# Patient Record
Sex: FEMALE | Race: WHITE | Employment: OTHER | URBAN - METROPOLITAN AREA
[De-identification: names, ages, dates, MRNs, and addresses within clinical notes are randomized per-mention and may not be internally consistent; named-entity substitution may affect disease eponyms.]

---

## 2017-11-10 ENCOUNTER — OFFICE VISIT (OUTPATIENT)
Dept: SURGERY | Facility: CLINIC | Age: 67
End: 2017-11-10
Payer: COMMERCIAL

## 2017-11-10 VITALS
BODY MASS INDEX: 27.64 KG/M2 | TEMPERATURE: 98.1 F | HEIGHT: 66 IN | HEART RATE: 72 BPM | DIASTOLIC BLOOD PRESSURE: 72 MMHG | WEIGHT: 172 LBS | SYSTOLIC BLOOD PRESSURE: 115 MMHG

## 2017-11-10 DIAGNOSIS — Z78.9: Primary | ICD-10-CM

## 2017-11-10 PROCEDURE — 99201 ZZC OFFICE/OUTPT VISIT, NEW, LEVEL I: CPT | Performed by: SURGERY

## 2017-11-10 RX ORDER — NAPROXEN 500 MG/1
500 TABLET ORAL
COMMUNITY
Start: 2017-11-08

## 2017-11-10 NOTE — NURSING NOTE
"Initial /72 (BP Location: Right arm, Patient Position: Chair, Cuff Size: Adult Regular)  Pulse 72  Temp 98.1  F (36.7  C) (Oral)  Ht 1.676 m (5' 6\")  Wt 78 kg (172 lb)  BMI 27.76 kg/m2 Estimated body mass index is 27.76 kg/(m^2) as calculated from the following:    Height as of this encounter: 1.676 m (5' 6\").    Weight as of this encounter: 78 kg (172 lb). .    Lynne Lobo MA    "

## 2017-11-10 NOTE — PROGRESS NOTES
67-year-old female complaining of right axillary mass for the past year. Patient reports the mass is enlarging and has a heartbeat. She is concerned this could be something serious. Patient recently had a mammogram within the past month which was read as totally negative. Also, one year ago she had an ultrasound done of the area which showed no abnormalities. On physical exam she has a palpable latissimus dorsi muscle tendon in this area and this seems to be the mass she is referring to. I can appreciate no adenopathy or other abnormalities. I reassured her that this was benign and a normal anatomic variant. We discussed what lymphadenopathy would feel like and I also reassured her that this was not cancer. She is welcome to return to clinic at any time.    Travon Poon MD

## 2017-11-10 NOTE — MR AVS SNAPSHOT
"              After Visit Summary   11/10/2017    Opal Dawson    MRN: [id number]           Patient Information     Date Of Birth          1950        Visit Information        Provider Department      11/10/2017 8:30 AM Travon Poon MD [facility]        Today's Diagnoses     Normal appearance of tissue    -  1      Care Instructions    Per Dr. Poon's instructions          Follow-ups after your visit        Who to contact     If you have questions or need follow up information about today's clinic visit or your schedule please contact [facility] directly at [phone].  Normal or non-critical lab and imaging results will be communicated to you by [email or web address], letter or phone within 4 business days after the clinic has received the results. If you do not hear from us within 7 days, please contact the clinic through [email or web address] or phone. If you have a critical or abnormal lab result, we will notify you by phone as soon as possible.  Submit refill requests through Vormetric or call your pharmacy and they will forward the refill request to us. Please allow 3 business days for your refill to be completed.          Additional Information About Your Visit        MyChart Information     Vormetric lets you send messages to your doctor, view your test results, renew your prescriptions, schedule appointments and more. To sign up, go to [email or web address] . Click on \"Log in\" on the left side of the screen, which will take you to the Welcome page. Then click on \"Sign up Now\" on the right side of the page.     You will be asked to enter the access code listed below, as well as some personal information. Please follow the directions to create your username and password.     Your access code is: [email or web address]  Expires: 2018  8:46 AM     Your access code will  in 90 days. If you need help or a new code, please call your [facility] or [phone].        Care EveryWhere ID     This is " "your Care EveryWhere ID. This could be used by other organizations to access your [facility] medical records  [id number]        Your Vitals Were     Pulse Temperature Height BMI (Body Mass Index)          72 98.1  F (36.7  C) (Oral) 1.676 m (5' 6\") 27.76 kg/m2         Blood Pressure from Last 3 Encounters:   11/10/17 115/72   11/10/15 137/72    Weight from Last 3 Encounters:   11/10/17 78 kg (172 lb)   11/09/15 83 kg (182 lb 15.7 oz)              Today, you had the following     No orders found for display       Primary Care Provider Office Phone # Fax #    Karyna Jakub Quezada [phone]       [address] [address]        Equal Access to Services     [facility]: Tiago pringle Sorandell, waaxda luqadaha, qaybta kaalmada ravinder, lukas gomez . So [facility] [phone].    ATENCIÓN: Si habla español, tiene a martinez disposición servicios gratuitos de asistencia lingüística. Betzaida al [phone].    We comply with applicable federal civil rights laws and [place] laws. We do not discriminate on the basis of race, color, national origin, age, disability, sex, sexual orientation, or gender identity.            Thank you!     Thank you for choosing [facility]  for your care. Our goal is always to provide you with excellent care. Hearing back from our patients is one way we can continue to improve our services. Please take a few minutes to complete the written survey that you may receive in the mail after your visit with us. Thank you!             Your Updated Medication List - Protect others around you: Learn how to safely use, store and throw away your medicines at www.disposemymeds.org.          This list is accurate as of: 11/10/17  8:46 AM.  Always use your most recent med list.                   Brand Name Dispense Instructions for use Diagnosis    naproxen 500 MG tablet    NAPROSYN     Take 500 mg by mouth        "

## 2021-05-28 ENCOUNTER — RECORDS - HEALTHEAST (OUTPATIENT)
Dept: ADMINISTRATIVE | Facility: CLINIC | Age: 71
End: 2021-05-28

## 2021-06-25 DIAGNOSIS — Z11.59 ENCOUNTER FOR SCREENING FOR OTHER VIRAL DISEASES: ICD-10-CM

## 2021-07-19 RX ORDER — ESCITALOPRAM OXALATE 5 MG/1
TABLET ORAL
COMMUNITY
Start: 2021-02-22 | End: 2021-07-19

## 2021-07-20 ENCOUNTER — ANESTHESIA EVENT (OUTPATIENT)
Dept: SURGERY | Facility: CLINIC | Age: 71
End: 2021-07-20
Payer: COMMERCIAL

## 2021-07-21 ENCOUNTER — ANESTHESIA (OUTPATIENT)
Dept: SURGERY | Facility: CLINIC | Age: 71
End: 2021-07-21
Payer: COMMERCIAL

## 2021-07-21 ENCOUNTER — HOSPITAL ENCOUNTER (OUTPATIENT)
Facility: CLINIC | Age: 71
Discharge: HOME | End: 2021-07-21
Attending: OPHTHALMOLOGY | Admitting: OPHTHALMOLOGY
Payer: COMMERCIAL

## 2021-07-21 VITALS
DIASTOLIC BLOOD PRESSURE: 79 MMHG | RESPIRATION RATE: 16 BRPM | HEART RATE: 65 BPM | TEMPERATURE: 98 F | OXYGEN SATURATION: 99 % | SYSTOLIC BLOOD PRESSURE: 140 MMHG

## 2021-07-21 DIAGNOSIS — H57.819 BROW PTOSIS: Primary | ICD-10-CM

## 2021-07-21 DIAGNOSIS — H02.834 DERMATOCHALASIS OF BOTH UPPER EYELIDS: ICD-10-CM

## 2021-07-21 DIAGNOSIS — H02.831 DERMATOCHALASIS OF BOTH UPPER EYELIDS: ICD-10-CM

## 2021-07-21 PROCEDURE — 250N000009 HC RX 250: Performed by: NURSE ANESTHETIST, CERTIFIED REGISTERED

## 2021-07-21 PROCEDURE — 250N000011 HC RX IP 250 OP 636: Performed by: NURSE ANESTHETIST, CERTIFIED REGISTERED

## 2021-07-21 PROCEDURE — 710N000012 HC RECOVERY PHASE 2, PER MINUTE: Performed by: OPHTHALMOLOGY

## 2021-07-21 PROCEDURE — 370N000017 HC ANESTHESIA TECHNICAL FEE, PER MIN: Performed by: OPHTHALMOLOGY

## 2021-07-21 PROCEDURE — 250N000009 HC RX 250: Performed by: OPHTHALMOLOGY

## 2021-07-21 PROCEDURE — 360N000076 HC SURGERY LEVEL 3, PER MIN: Performed by: OPHTHALMOLOGY

## 2021-07-21 PROCEDURE — 272N000001 HC OR GENERAL SUPPLY STERILE: Performed by: OPHTHALMOLOGY

## 2021-07-21 PROCEDURE — 250N000013 HC RX MED GY IP 250 OP 250 PS 637: Performed by: NURSE ANESTHETIST, CERTIFIED REGISTERED

## 2021-07-21 PROCEDURE — 258N000003 HC RX IP 258 OP 636: Performed by: NURSE ANESTHETIST, CERTIFIED REGISTERED

## 2021-07-21 PROCEDURE — 999N000141 HC STATISTIC PRE-PROCEDURE NURSING ASSESSMENT: Performed by: OPHTHALMOLOGY

## 2021-07-21 RX ORDER — ONDANSETRON 2 MG/ML
4 INJECTION INTRAMUSCULAR; INTRAVENOUS EVERY 30 MIN PRN
Status: DISCONTINUED | OUTPATIENT
Start: 2021-07-21 | End: 2021-07-21 | Stop reason: HOSPADM

## 2021-07-21 RX ORDER — NALOXONE HYDROCHLORIDE 0.4 MG/ML
0.4 INJECTION, SOLUTION INTRAMUSCULAR; INTRAVENOUS; SUBCUTANEOUS
Status: DISCONTINUED | OUTPATIENT
Start: 2021-07-21 | End: 2021-07-21 | Stop reason: HOSPADM

## 2021-07-21 RX ORDER — BUPIVACAINE HYDROCHLORIDE AND EPINEPHRINE 5; 5 MG/ML; UG/ML
INJECTION, SOLUTION PERINEURAL PRN
Status: DISCONTINUED | OUTPATIENT
Start: 2021-07-21 | End: 2021-07-21 | Stop reason: HOSPADM

## 2021-07-21 RX ORDER — ERYTHROMYCIN 5 MG/G
OINTMENT OPHTHALMIC PRN
Status: DISCONTINUED | OUTPATIENT
Start: 2021-07-21 | End: 2021-07-21 | Stop reason: HOSPADM

## 2021-07-21 RX ORDER — ONDANSETRON 4 MG/1
4 TABLET, ORALLY DISINTEGRATING ORAL EVERY 30 MIN PRN
Status: DISCONTINUED | OUTPATIENT
Start: 2021-07-21 | End: 2021-07-21 | Stop reason: HOSPADM

## 2021-07-21 RX ORDER — TETRACAINE HYDROCHLORIDE 5 MG/ML
SOLUTION OPHTHALMIC PRN
Status: DISCONTINUED | OUTPATIENT
Start: 2021-07-21 | End: 2021-07-21 | Stop reason: HOSPADM

## 2021-07-21 RX ORDER — NALOXONE HYDROCHLORIDE 0.4 MG/ML
0.2 INJECTION, SOLUTION INTRAMUSCULAR; INTRAVENOUS; SUBCUTANEOUS
Status: DISCONTINUED | OUTPATIENT
Start: 2021-07-21 | End: 2021-07-21 | Stop reason: HOSPADM

## 2021-07-21 RX ORDER — DEXAMETHASONE SODIUM PHOSPHATE 4 MG/ML
INJECTION, SOLUTION INTRA-ARTICULAR; INTRALESIONAL; INTRAMUSCULAR; INTRAVENOUS; SOFT TISSUE PRN
Status: DISCONTINUED | OUTPATIENT
Start: 2021-07-21 | End: 2021-07-21

## 2021-07-21 RX ORDER — ERYTHROMYCIN 5 MG/G
0.5 OINTMENT OPHTHALMIC 3 TIMES DAILY
Qty: 7 G | Refills: 1 | Status: SHIPPED | OUTPATIENT
Start: 2021-07-21

## 2021-07-21 RX ORDER — SODIUM CHLORIDE, SODIUM LACTATE, POTASSIUM CHLORIDE, CALCIUM CHLORIDE 600; 310; 30; 20 MG/100ML; MG/100ML; MG/100ML; MG/100ML
INJECTION, SOLUTION INTRAVENOUS CONTINUOUS
Status: DISCONTINUED | OUTPATIENT
Start: 2021-07-21 | End: 2021-07-21 | Stop reason: HOSPADM

## 2021-07-21 RX ORDER — LIDOCAINE 40 MG/G
CREAM TOPICAL
Status: DISCONTINUED | OUTPATIENT
Start: 2021-07-21 | End: 2021-07-21 | Stop reason: HOSPADM

## 2021-07-21 RX ORDER — HYDROCODONE BITARTRATE AND ACETAMINOPHEN 5; 325 MG/1; MG/1
1 TABLET ORAL EVERY 6 HOURS PRN
Qty: 10 TABLET | Refills: 0 | Status: SHIPPED | OUTPATIENT
Start: 2021-07-21 | End: 2021-07-24

## 2021-07-21 RX ORDER — ONDANSETRON 2 MG/ML
INJECTION INTRAMUSCULAR; INTRAVENOUS PRN
Status: DISCONTINUED | OUTPATIENT
Start: 2021-07-21 | End: 2021-07-21

## 2021-07-21 RX ORDER — KETAMINE HYDROCHLORIDE 10 MG/ML
INJECTION INTRAMUSCULAR; INTRAVENOUS PRN
Status: DISCONTINUED | OUTPATIENT
Start: 2021-07-21 | End: 2021-07-21

## 2021-07-21 RX ORDER — MEPERIDINE HYDROCHLORIDE 25 MG/ML
12.5 INJECTION INTRAMUSCULAR; INTRAVENOUS; SUBCUTANEOUS
Status: DISCONTINUED | OUTPATIENT
Start: 2021-07-21 | End: 2021-07-21 | Stop reason: HOSPADM

## 2021-07-21 RX ORDER — ACETAMINOPHEN 325 MG/1
975 TABLET ORAL ONCE
Status: COMPLETED | OUTPATIENT
Start: 2021-07-21 | End: 2021-07-21

## 2021-07-21 RX ORDER — PROPOFOL 10 MG/ML
INJECTION, EMULSION INTRAVENOUS CONTINUOUS PRN
Status: DISCONTINUED | OUTPATIENT
Start: 2021-07-21 | End: 2021-07-21

## 2021-07-21 RX ADMIN — MIDAZOLAM 2 MG: 1 INJECTION INTRAMUSCULAR; INTRAVENOUS at 13:16

## 2021-07-21 RX ADMIN — SODIUM CHLORIDE, POTASSIUM CHLORIDE, SODIUM LACTATE AND CALCIUM CHLORIDE: 600; 310; 30; 20 INJECTION, SOLUTION INTRAVENOUS at 13:03

## 2021-07-21 RX ADMIN — PROPOFOL 50 MCG/KG/MIN: 10 INJECTION, EMULSION INTRAVENOUS at 13:19

## 2021-07-21 RX ADMIN — KETAMINE HYDROCHLORIDE 10 MG: 10 INJECTION, SOLUTION INTRAMUSCULAR; INTRAVENOUS at 13:52

## 2021-07-21 RX ADMIN — ACETAMINOPHEN 975 MG: 325 TABLET, FILM COATED ORAL at 12:50

## 2021-07-21 RX ADMIN — KETAMINE HYDROCHLORIDE 20 MG: 10 INJECTION, SOLUTION INTRAMUSCULAR; INTRAVENOUS at 13:19

## 2021-07-21 RX ADMIN — ONDANSETRON 8 MG: 2 INJECTION INTRAMUSCULAR; INTRAVENOUS at 13:54

## 2021-07-21 RX ADMIN — LIDOCAINE HYDROCHLORIDE 0.1 ML: 10 INJECTION, SOLUTION EPIDURAL; INFILTRATION; INTRACAUDAL; PERINEURAL at 13:03

## 2021-07-21 RX ADMIN — DEXAMETHASONE SODIUM PHOSPHATE 4 MG: 4 INJECTION, SOLUTION INTRA-ARTICULAR; INTRALESIONAL; INTRAMUSCULAR; INTRAVENOUS; SOFT TISSUE at 13:19

## 2021-07-21 ASSESSMENT — LIFESTYLE VARIABLES: TOBACCO_USE: 1

## 2021-07-21 NOTE — OP NOTE
"Pre-operative Diagnosis:   1. Visually significant brow ptosis  2. Visually significant dermatochalasis         Post-operative Diagnosis:  Same as above      Procedure(s):   1. Bilateral upper eyelid blepharoplasty with medial fat excision   2. Bilateral brow ptosis repair (direct browplasty)       Surgeon: Debbie Young MD      Anesthesia:  Monitored anesthesia care with local anesthetic      Blood loss: <5 cc      Specimens: None     Findings: See operative report for details       Complications: None      Operative Procedure:  In the pre operative area, the planned procedure was again discussed with the patient as well as the risks/benefits/alternatives. The patient was brought to the operating room.  A \"time out\" was performed to ensure the correct surgical site was being operated on.  Topical anesthesia was placed in both eyes.  The eyelid skin was cleaned with isopropyl alcohol. Blepharoplasty incisions were marked with care taken to avoid post operative lagophthalmos. Browplasty incisions were also marked with care taken to use the patient's natural creases. Local anesthetic was injected.  The patient was prepped and draped in the usual sterile fashion.       Attention was turned to the brow markings.  An incision was made through the skin and the tissue was dissected in a subcutaneous plane.  Hemostasis was achieved with cautery.  The deep tissue was closed with buried, interrupted 4-0 vicryl sutures and the skin edges were closed with 5-0 fast absorbing gut suture in a running fashion with care taken to marisol the skin edges.  The same procedure was performed on the side.      Attention was directed to the upper eyelids.  An incision was made with the #15 blade through the skin. The tissue was removed in a preseptal plane.  Hemostasis was achieved with cautery. Hemostasis was again confirmed.  Temporally, some orbicularis was removed to prevent post op brow ptosis. The upper eyelid skin incisions were " closed with 5-0 fast absorbing gut sutures in an interrupted and running fashion.  This same procedure was performed on each side.     The eyelids were washed with wet 4x4 gauze. Antibiotic ointment was placed on each eyelid. The patient was transferred to recovery in stable position.  Ice packs were placed on each eyelid.  The patient will return for a post-operative follow up appointment, sooner with any decrease in vision, increase in pain, redness, or bleeding.      Debbie Young MD

## 2021-07-21 NOTE — OR NURSING
Pt at and oriented x4, tolerated food and fluids. Discharged with friend Kulwant.    Jessica Smith RN on 7/21/2021 at 3:14 PM

## 2021-07-21 NOTE — DISCHARGE INSTRUCTIONS
Same Day Surgery Discharge Instructions  Special Precautions After Surgery - Adult    1. It is not unusual to feel lightheaded or faint, up to 24 hours after surgery or while taking pain medication.  If you have these symptoms; sit for a few minutes before standing and have someone assist you when getting up.  2. You should rest and relax for the next 24 hours and must have someone stay with you for at least 24 hours after your discharge.  3. DO NOT DRIVE any vehicle or operate mechanical equipment for 24 hours following the end of your surgery.  DO NOT DRIVE while taking narcotic pain medications that have been prescribed by your physician.  If you had a limb operated on, you must be able to use it fully to drive.  4. DO NOT drink alcoholic beverages for 24 hours following surgery or while taking prescription pain medication.  5. Drink clear liquids (apple juice, ginger ale, broth, 7-Up, etc.).  Progress to your regular diet as you feel able.  6. Any questions call your physician and do not make important decisions for 24 hours.    Your Medication Plan:    Acetaminophen (Tylenol): Next Dose: 6:50 pm. Take 650-1000 mg every 6 hours for mild to moderate pain.    Ibuprofen (Motrin, Advil): Next Dose: When you get home. Take 600 mg every 6 hours for mild to moderate pain.    Hydrocodone-tylenol (Norco): Next dose: As needed for pain not controlled with tylenol and ibprofen.  Take 1 tab(s) every 6 hours for moderated to severe pain.    You should not exceed 1000 mg of acetaminophen (Tylenol) per dose or 4000 mg per day. If you are taking a narcotic that contains acetaminophen (Tylenol) keep track of your Tylenol dosage.    If you are prescribed narcotic pain medications (Oxycodone, Norco, Percocet, Tylenol #3, Dilaudid, etc) then you should try to wean off of them as tolerated. These are AS NEEDED medications, so if you are not having significant pain you should try to take fewer pills at a time or  spread the doses out over a longer period of time than is written on the prescription. As an example if you are prescribed 1-2 pills of pain medication every 4 hours AS NEEDED for pain, then you should begin taking only 1 pill every 4 hours as your pain tolerates. Then when 1 pill is giving good pain relief you should try to spread the doses out longer than 4 hours apart as you can tolerate it.    Senna-Docusate (Stool Softener): Next dose: When you get home if you are taking the narcotics. Take as instructed on the bottle. This is an over-the-counter medication. Take this while taking narcotic medications to prevent constipation.    Nausea and Vomiting: Nausea and vomiting can occur any time after receiving anesthesia. If you experience nausea and vomiting we encourage you to move to a clear liquid diet and advance your diet as tolerated. If unable to keep fluids down, and if nausea and vomiting do not improve within 12 hours call the surgeon or go to the Emergency department.     Break-through Bleeding: If you experience bleeding from your surgical site you may need to reinforce the dressing with more gauze and tape. Apply slight pressure on the site to help stop the bleeding. If bleeding does not subside contact the surgeon or present to the Emergency Department.    Post-op Infection: If you develop a fever of 100.4 or greater, have pus like drainage, redness, swelling or severe pain at the surgical site not alleviated with pain medications; call the surgeon or go to the Emergency Department.    When to call your healthcare provider  Call if you have any of the following:    You have not urinated within the time your healthcare team noted (within 8 hours of leaving the hospital)    You have not had a bowel movement within the time your healthcare team noted    Your pain gets worse and is not eased by pain medicine  Call if you have any of these symptoms:    A red, hard, hot, or painful area around the surgical  site    Shortness of breath    Chest pain    __________________________________________________________________________________________________________________________________  IMPORTANT NUMBERS:    [facility] Main Number:  [phone], [phone]  Pharmacy:  [phone]  Same Day Surgery:  [phone], Monday - Friday until 8:30 p.m.  Urgent Care:  [phone]  Emergency Room:  [phone]

## 2021-07-21 NOTE — ANESTHESIA PREPROCEDURE EVALUATION
Anesthesia Pre-Procedure Evaluation    Patient: Opal Dawson   MRN: [id number] : 1950        Preoperative Diagnosis: Dermatochalasis of both upper and lower eyelid of right eye [H02.831, H02.832]  Dermatochalasis of both upper and lower eyelid of left eye [H02.834, H02.835]  Brow ptosis, bilateral [H57.813]   Procedure : Procedure(s):  Bilateral Upper Blepharoplasty and Bilateral Browplasty Repair     No past medical history on file.   No past surgical history on file.   Allergies   Allergen Reactions     Sulfa Drugs Hives      Social History     Tobacco Use     Smoking status: Current Every Day Smoker     Types: Cigarettes, e-Cigarettes or another electronic nicotine delivery system     Smokeless tobacco: Never Used   Substance Use Topics     Alcohol use: Yes     Alcohol/week: 1.0 standard drinks     Types: 1 Standard drinks or equivalent per week      Wt Readings from Last 1 Encounters:   11/10/17 78 kg (172 lb)        Anesthesia Evaluation   Pt has had prior anesthetic. Type: General.        ROS/MED HX  ENT/Pulmonary:     (+) allergic rhinitis, tobacco use, Current use,     Neurologic:  - neg neurologic ROS     Cardiovascular:  - neg cardiovascular ROS   (+) -----Previous cardiac testing   Echo: Date: Results:  Ef 65% with normal aortic valve  Stress Test: Date: Results:    ECG Reviewed: Date: Results:    Cath: Date: Results:      METS/Exercise Tolerance:     Hematologic:  - neg hematologic  ROS     Musculoskeletal:  - neg musculoskeletal ROS     GI/Hepatic:  - neg GI/hepatic ROS     Renal/Genitourinary:  - neg Renal ROS     Endo:  - neg endo ROS     Psychiatric/Substance Use:     (+) psychiatric history anxiety     Infectious Disease:  - neg infectious disease ROS     Malignancy:  - neg malignancy ROS     Other:            Physical Exam    Airway        Mallampati: II   TM distance: > 3 FB   Neck ROM: full   Mouth opening: > 3 cm    Respiratory Devices and Support         Dental  no notable dental  history         Cardiovascular             Pulmonary   pulmonary exam normal                OUTSIDE LABS:  CBC:   Lab Results   Component Value Date    WBC 10.8 11/09/2015    HGB 12.8 11/09/2015    HCT 38.6 11/09/2015     11/09/2015     BMP:   Lab Results   Component Value Date     11/09/2015    POTASSIUM 4.2 11/09/2015    CHLORIDE 109 11/09/2015    CO2 27 11/09/2015    BUN 24 11/09/2015    CR 0.92 11/09/2015     (H) 11/09/2015     COAGS:   Lab Results   Component Value Date    INR 1.04 11/09/2015     POC: No results found for: BGM, HCG, HCGS  HEPATIC:   Lab Results   Component Value Date    ALBUMIN 3.4 11/09/2015    PROTTOTAL 6.7 (L) 11/09/2015    ALT 38 11/09/2015    AST 25 11/09/2015    ALKPHOS 66 11/09/2015    BILITOTAL 0.3 11/09/2015     OTHER:   Lab Results   Component Value Date    SHANNA 8.0 (L) 11/09/2015    TSH 4.16 (H) 11/10/2015    T4 1.00 11/10/2015       Anesthesia Plan    ASA Status:  2      Anesthesia Type: MAC.   Induction: Intravenous.           Consents    Anesthesia Plan(s) and associated risks, benefits, and realistic alternatives discussed. Questions answered and patient/representative(s) expressed understanding.     - Discussed with:  Patient         Postoperative Care    Pain management: IV analgesics, Oral pain medications.   PONV prophylaxis: Ondansetron (or other 5HT-3), Dexamethasone or Solumedrol     Comments:                SERGIO Smith CRNA

## 2021-07-21 NOTE — ANESTHESIA CARE TRANSFER NOTE
Patient: Opal Dawson    Procedure(s):  Bilateral Upper Blepharoplasty and Bilateral Browplasty Repair    Diagnosis: Dermatochalasis of both upper and lower eyelid of right eye [H02.831, H02.832]  Dermatochalasis of both upper and lower eyelid of left eye [H02.834, H02.835]  Brow ptosis, bilateral [H57.813]  Diagnosis Additional Information: No value filed.    Anesthesia Type:   MAC     Note:      Level of Consciousness: awake  Oxygen Supplementation: room air    Independent Airway: airway patency satisfactory and stable  Dentition: dentition unchanged  Vital Signs Stable: post-procedure vital signs reviewed and stable  Report to RN Given: handoff report given  Patient transferred to: Phase II    Handoff Report: Identifed the Patient, Identified the Reponsible Provider, Reviewed the pertinent medical history, Discussed the surgical course, Reviewed Intra-OP anesthesia mangement and issues during anesthesia, Set expectations for post-procedure period and Allowed opportunity for questions and acknowledgement of understanding      Vitals:  Vitals Value Taken Time   BP     Temp     Pulse     Resp     SpO2         Electronically Signed By: SERGIO Smith CRNA  July 21, 2021  2:21 PM

## 2021-07-21 NOTE — ANESTHESIA POSTPROCEDURE EVALUATION
Patient: Opal Dawson    Procedure(s):  Bilateral Upper Blepharoplasty and Bilateral Browplasty Repair    Diagnosis:Dermatochalasis of both upper and lower eyelid of right eye [H02.831, H02.832]  Dermatochalasis of both upper and lower eyelid of left eye [H02.834, H02.835]  Brow ptosis, bilateral [H57.813]  Diagnosis Additional Information: No value filed.    Anesthesia Type:  MAC    Note:  Disposition: Outpatient   Postop Pain Control: Uneventful            Sign Out: Well controlled pain   PONV: No   Neuro/Psych: Uneventful            Sign Out: Acceptable/Baseline neuro status   Airway/Respiratory: Uneventful            Sign Out: Acceptable/Baseline resp. status   CV/Hemodynamics: Uneventful            Sign Out: Acceptable CV status; No obvious hypovolemia; No obvious fluid overload   Other NRE: NONE   DID A NON-ROUTINE EVENT OCCUR? No           Last vitals:  Vitals Value Taken Time   /79 07/21/21 1430   Temp     Pulse 65 07/21/21 1430   Resp     SpO2 99 % 07/21/21 1442   Vitals shown include unvalidated device data.    Electronically Signed By: SERGIO Smith CRNA  July 21, 2021  2:44 PM

## 2021-07-21 NOTE — ANESTHESIA POSTPROCEDURE EVALUATION
Patient: Opal Dawson    Procedure(s):  Bilateral Upper Blepharoplasty and Bilateral Browplasty Repair    Diagnosis:Dermatochalasis of both upper and lower eyelid of right eye [H02.831, H02.832]  Dermatochalasis of both upper and lower eyelid of left eye [H02.834, H02.835]  Brow ptosis, bilateral [H57.813]  Diagnosis Additional Information: No value filed.    Anesthesia Type:  MAC    Note:  Disposition: Outpatient   Postop Pain Control:    PONV: No   Neuro/Psych: Uneventful            Sign Out: Acceptable/Baseline neuro status   Airway/Respiratory: Uneventful            Sign Out: Acceptable/Baseline resp. status   CV/Hemodynamics: Uneventful            Sign Out: Acceptable CV status; No obvious hypovolemia; No obvious fluid overload   Other NRE: NONE   DID A NON-ROUTINE EVENT OCCUR? No           Last vitals:  Vitals Value Taken Time   /79 07/21/21 1430   Temp     Pulse 65 07/21/21 1430   Resp     SpO2 99 % 07/21/21 1442   Vitals shown include unvalidated device data.    Electronically Signed By: SERGIO Smith CRNA  July 21, 2021  2:45 PM

## (undated) DEVICE — PACK OCULOPLATIC SEN15OCFSD

## (undated) DEVICE — ESU NDL COLORADO MICRO 3CM STR N103A

## (undated) DEVICE — GLOVE PROTEXIS MICRO 6.5  2D73PM65

## (undated) DEVICE — PEN MARKING SKIN W/LABELS 31145884

## (undated) DEVICE — SOL WATER IRRIG 1000ML BOTTLE 07139-09

## (undated) DEVICE — SU PLAIN FAST ABSORB 5-0 PC-1 18" 1915G

## (undated) DEVICE — SU VICRYL 4-0 PS-2 18" UND J504G

## (undated) DEVICE — PEN MARKING SKIN FINE 31145942

## (undated) DEVICE — SOL NACL 0.9% IRRIG 1000ML BOTTLE 07138-09

## (undated) DEVICE — DECANTER VIAL 2006S

## (undated) DEVICE — ESU PENCIL SMOKE EVAC W/ROCKER SWITCH 0703-047-000

## (undated) RX ORDER — ONDANSETRON 2 MG/ML
INJECTION INTRAMUSCULAR; INTRAVENOUS
Status: DISPENSED
Start: 2021-07-21

## (undated) RX ORDER — ACETAMINOPHEN 325 MG/1
TABLET ORAL
Status: DISPENSED
Start: 2021-07-21

## (undated) RX ORDER — DEXAMETHASONE SODIUM PHOSPHATE 4 MG/ML
INJECTION, SOLUTION INTRA-ARTICULAR; INTRALESIONAL; INTRAMUSCULAR; INTRAVENOUS; SOFT TISSUE
Status: DISPENSED
Start: 2021-07-21